# Patient Record
Sex: FEMALE | Race: WHITE
[De-identification: names, ages, dates, MRNs, and addresses within clinical notes are randomized per-mention and may not be internally consistent; named-entity substitution may affect disease eponyms.]

---

## 2023-04-11 ENCOUNTER — APPOINTMENT (OUTPATIENT)
Dept: MAMMOGRAPHY | Facility: CLINIC | Age: 48
End: 2023-04-11
Payer: COMMERCIAL

## 2023-04-11 ENCOUNTER — APPOINTMENT (OUTPATIENT)
Dept: ULTRASOUND IMAGING | Facility: CLINIC | Age: 48
End: 2023-04-11
Payer: COMMERCIAL

## 2023-04-11 PROCEDURE — 76641 ULTRASOUND BREAST COMPLETE: CPT | Mod: LT

## 2023-04-11 PROCEDURE — 77063 BREAST TOMOSYNTHESIS BI: CPT

## 2023-04-11 PROCEDURE — 77067 SCR MAMMO BI INCL CAD: CPT

## 2024-07-09 ENCOUNTER — APPOINTMENT (OUTPATIENT)
Dept: ULTRASOUND IMAGING | Facility: CLINIC | Age: 49
End: 2024-07-09

## 2024-07-09 ENCOUNTER — APPOINTMENT (OUTPATIENT)
Dept: MAMMOGRAPHY | Facility: CLINIC | Age: 49
End: 2024-07-09
Payer: COMMERCIAL

## 2024-07-09 PROCEDURE — 77063 BREAST TOMOSYNTHESIS BI: CPT | Mod: 59

## 2024-07-09 PROCEDURE — 77065 DX MAMMO INCL CAD UNI: CPT | Mod: GG,LT

## 2024-07-09 PROCEDURE — 76641 ULTRASOUND BREAST COMPLETE: CPT | Mod: 50

## 2024-07-09 PROCEDURE — 77067 SCR MAMMO BI INCL CAD: CPT | Mod: 59

## 2024-08-02 ENCOUNTER — TRANSCRIPTION ENCOUNTER (OUTPATIENT)
Age: 49
End: 2024-08-02

## 2024-08-05 ENCOUNTER — APPOINTMENT (OUTPATIENT)
Dept: MAMMOGRAPHY | Facility: CLINIC | Age: 49
End: 2024-08-05

## 2024-08-05 PROCEDURE — A4648: CPT | Mod: 59

## 2024-08-05 PROCEDURE — 77065 DX MAMMO INCL CAD UNI: CPT | Mod: LT

## 2024-08-05 PROCEDURE — 19081 BX BREAST 1ST LESION STRTCTC: CPT | Mod: LT

## 2024-08-12 ENCOUNTER — NON-APPOINTMENT (OUTPATIENT)
Age: 49
End: 2024-08-12

## 2024-09-10 ENCOUNTER — APPOINTMENT (OUTPATIENT)
Dept: BREAST CENTER | Facility: CLINIC | Age: 49
End: 2024-09-10
Payer: COMMERCIAL

## 2024-09-10 VITALS
WEIGHT: 153 LBS | HEIGHT: 62 IN | DIASTOLIC BLOOD PRESSURE: 71 MMHG | BODY MASS INDEX: 28.16 KG/M2 | SYSTOLIC BLOOD PRESSURE: 129 MMHG | HEART RATE: 100 BPM

## 2024-09-10 DIAGNOSIS — Z85.850 PERSONAL HISTORY OF MALIGNANT NEOPLASM OF THYROID: ICD-10-CM

## 2024-09-10 DIAGNOSIS — N60.92 UNSPECIFIED BENIGN MAMMARY DYSPLASIA OF LEFT BREAST: ICD-10-CM

## 2024-09-10 PROCEDURE — 99204 OFFICE O/P NEW MOD 45 MIN: CPT

## 2024-09-10 RX ORDER — METFORMIN HYDROCHLORIDE 500 MG/1
500 TABLET, COATED ORAL
Refills: 0 | Status: ACTIVE | COMMUNITY

## 2024-09-10 RX ORDER — UBIDECARENONE/VIT E ACET 100MG-5
CAPSULE ORAL
Refills: 0 | Status: ACTIVE | COMMUNITY

## 2024-09-10 NOTE — HISTORY OF PRESENT ILLNESS
[FreeTextEntry1] : 48 year old female was referred by Dr. Salazar (OBGYN) who presents for initial evaluation regarding L ADH/IDP, initially seen on screening imaging (7/9/24) as a 0.5cm group of indeterminate calcs in the L UOQ (6cmfn) for which stereotactic biopsy was recommended and completed on 8/5/24; path revealed focal FEA and ADH, a minute incidental intraductal IDP, and fibrocystic changes associated with calcifications; high risk and concordant, recommending surgical or oncologic management. Patient denies palpable masses, nipple discharge, skin changes.  Denies prior breast surgeries. Patient is adopted and unable provide family history. Patient has not had genetic testing performed.   Darya Lifetime Risk 43.3%  Patient reports hx of thyroid cancer (currently being monitored) and insulin-resistant diabetes, but otherwise denies history of heart disease, HTN, blood clots, sleep apnea, COPD, or issues with anesthesia. Patient states she has not undergone anesthesia since childhood. Patient denies any known drug allergies.

## 2024-09-10 NOTE — CONSULT LETTER
[Dear  ___] : Dear ~DENNIS, [Consult Letter:] : I had the pleasure of evaluating your patient, [unfilled]. [Please see my note below.] : Please see my note below. [Consult Closing:] : Thank you very much for allowing me to participate in the care of this patient.  If you have any questions, please do not hesitate to contact me. [Sincerely,] : Sincerely, [FreeTextEntry2] : Dr. Salazar [FreeTextEntry3] : Leon Ma MD Chief of Breast Surgery Director of Breast Cancer Program [facility]

## 2024-09-10 NOTE — CONSULT LETTER
[Dear  ___] : Dear ~DENNIS, [Consult Letter:] : I had the pleasure of evaluating your patient, [unfilled]. [Please see my note below.] : Please see my note below. [Consult Closing:] : Thank you very much for allowing me to participate in the care of this patient.  If you have any questions, please do not hesitate to contact me. [Sincerely,] : Sincerely, [FreeTextEntry2] : Dr. Salazar [FreeTextEntry3] : Leon Ma MD Chief of Breast Surgery Director of Breast Cancer Program [facility]

## 2024-09-10 NOTE — CONSULT LETTER
[Dear  ___] : Dear ~DENNIS, [Consult Letter:] : I had the pleasure of evaluating your patient, [unfilled]. [Please see my note below.] : Please see my note below. [Consult Closing:] : Thank you very much for allowing me to participate in the care of this patient.  If you have any questions, please do not hesitate to contact me. [Sincerely,] : Sincerely, [FreeTextEntry2] : Dr. Salazar [FreeTextEntry3] : Leon Ma MD Chief of Breast Surgery Director of Breast Cancer Program [facility]

## 2024-09-10 NOTE — DATA REVIEWED
[FreeTextEntry1] : 7/9/24 ([facility]) B/L sMMG/US: heterogeneously dense. Dilated ducts are present bilaterally. L UOQ 6.6cmfn is a 0.5cm group of indeterminate calcs. BI-RADS 4B, moderately suspicious. Follow Up: L stereotactic biopsy   8/5/24 ([facility]) L stereotactic biopsy- L UOQ calcs (top hat): focal flat epithelial atypia and atypical ductal hyperplasia, both associated with calcifications. Fibrocystic changes associated with calcifications. Minute incidental intraductal IDP.

## 2024-09-10 NOTE — PAST MEDICAL HISTORY
[Menarche Age ____] : age at menarche was [unfilled] [Definite ___ (Date)] : the last menstrual period was [unfilled] [Regular Cycle Intervals] : have been regular [Total Preg ___] : G[unfilled] [Live Births ___] : P[unfilled]  [History of Hormone Replacement Treatment] : has no history of hormone replacement treatment [FreeTextEntry6] : no [FreeTextEntry7] : no [FreeTextEntry8] : n/a

## 2024-09-10 NOTE — ASSESSMENT
[FreeTextEntry1] : 48 year old female presents for initial evaluation regarding L ADH/IDP, initially seen on screening imaging (7/9/24) as a 0.5cm group of indeterminate calcs in the L UOQ (6cmfn) for which stereotactic biopsy was recommended and completed on 8/5/24; path revealed focal FEA and ADH, a minute incidental intraductal IDP, and fibrocystic changes associated with calcifications; high risk and concordant, recommending surgical or oncologic management.  DEBBIE 43.3%. Discussed with the patient the implications for her lifetime risk, which is considered to be at high risk to develop breast cancer over the span of her lifetime. Reviewed imaging findings and pathology; discussed results with patient that reveals high risk lesions (ADH/IDP). Discussed ADH and IDP as high-risk lesions with low breast cancer association risk, however given the atypia, surgical excision is recommended due to the increased risk of cancer revealed on final surgical pathology. Reviewed ADH and the associated cancer risk, along with her elevated DEBBIE score, and the recommended excisional biopsy. Discussed options of surgical excision that is advised to r/o underlying carcinoma vs. observation on imaging. 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  Reviewed the nature of the procedure for a localized excisional biopsy. Discussed the indication for additional treatment depending upon the final surgical pathology should the results yield malignancy, atypical cells, or phyllodes tumor. Discussed the benefits and the risks of the surgery, not limited to anesthesia risks, bleeding, skin breakdown, infection, and numbness of the skin. All the patients questions were answered. Plan for L localized excision, pending PCP clearance. Surgical consent obtained today; surgical coordinator aware. Patient verbalized understanding and agreement of plan.